# Patient Record
Sex: FEMALE | Race: WHITE | NOT HISPANIC OR LATINO
[De-identification: names, ages, dates, MRNs, and addresses within clinical notes are randomized per-mention and may not be internally consistent; named-entity substitution may affect disease eponyms.]

---

## 2019-01-25 ENCOUNTER — APPOINTMENT (OUTPATIENT)
Dept: ORTHOPEDIC SURGERY | Facility: CLINIC | Age: 69
End: 2019-01-25
Payer: MEDICARE

## 2019-01-25 VITALS — BODY MASS INDEX: 33.66 KG/M2 | HEIGHT: 63 IN | WEIGHT: 190 LBS

## 2019-01-25 DIAGNOSIS — Z78.9 OTHER SPECIFIED HEALTH STATUS: ICD-10-CM

## 2019-01-25 DIAGNOSIS — Z86.39 PERSONAL HISTORY OF OTHER ENDOCRINE, NUTRITIONAL AND METABOLIC DISEASE: ICD-10-CM

## 2019-01-25 PROCEDURE — 20611 DRAIN/INJ JOINT/BURSA W/US: CPT | Mod: RT

## 2019-01-25 PROCEDURE — 99204 OFFICE O/P NEW MOD 45 MIN: CPT | Mod: 25

## 2019-01-25 RX ORDER — MELOXICAM 7.5 MG/1
7.5 TABLET ORAL
Refills: 0 | Status: ACTIVE | COMMUNITY

## 2019-01-25 RX ORDER — DICLOFENAC SODIUM 75 MG/1
75 TABLET, DELAYED RELEASE ORAL
Qty: 60 | Refills: 1 | Status: COMPLETED | COMMUNITY
Start: 2019-01-25 | End: 2019-03-26

## 2019-01-25 RX ORDER — CYCLOBENZAPRINE HYDROCHLORIDE 5 MG/1
5 TABLET, FILM COATED ORAL
Refills: 0 | Status: ACTIVE | COMMUNITY

## 2019-01-25 RX ORDER — GLIMEPIRIDE 4 MG/1
TABLET ORAL
Refills: 0 | Status: ACTIVE | COMMUNITY

## 2019-01-25 RX ORDER — LEVOTHYROXINE SODIUM 0.03 MG/1
25 TABLET ORAL
Refills: 0 | Status: ACTIVE | COMMUNITY

## 2019-01-25 RX ORDER — METFORMIN HYDROCHLORIDE 625 MG/1
TABLET ORAL
Refills: 0 | Status: ACTIVE | COMMUNITY

## 2019-01-25 RX ORDER — DICLOFENAC SODIUM 10 MG/G
1 GEL TOPICAL
Refills: 0 | Status: ACTIVE | COMMUNITY

## 2019-01-25 NOTE — HISTORY OF PRESENT ILLNESS
[de-identified] : CC right hip\par \par HPI 67 yo female right HD presents with gradual onset of many months of occasional pain in the right buttock [without Injury]. The pain is same, and rated a 10 out of 10, described as burning throbbing aching, with radiation down the leg to the foot with numbness. Nothing makes the pain better and everything makes the pain worse. The patient reports associated symptoms of numbness weakness. The patient + similar pain previously spontaneously resolves.\par \par \par Previous treatments include:\par Activity Modification	+\par Ice/Compression 	+\par NSAIDs  		+\par Physical Therapy 	-\par Cortisone Injection	-\par Surgery  		-\par \par Review of Systems is positive for the above musculoskeletal symptoms and is otherwise non-contributory for general, constitutional, psychiatric, neurologic, HEENT, cardiac, respiratory, gastrointestinal, reproductive, lymphatic, and dermatologic complaints.\par \par Consult By Dr. Syeda Grace

## 2019-01-25 NOTE — DISCUSSION/SUMMARY
[de-identified] : Mild right hip osteoarthritis\par Likely radicular pain down the right lower extremity\par \par Discussed my findings and history exam and radiology given the above I recommend proceeding with ruling out hip pathology as the source of the patient's pain or treating her nonoperatively with a fall\par \par The patient was prescribed Diclofenac PO non-steroidal anti-inflammatory medication. 50mg tablets twice daily to be taken for at least 1-2 weeks in a row and then PRN afterwards. Risks and benefits were discussed and include but not limited to renal damage and GI ulceration and bleeding.  They were advised to take with food to limit stomach upset as well as warned to stop the medication if worsening gastric pain or dizziness or other side effects. Also to immediately stop the medication and seek appropriate medical attention if any severe stomach ache, gastritis, black/red vomit, black/red stools or any other medical concern.\par \par \par Physical therapy\par \par Procedure Note:\par \par Verbal consent was obtained for arthrocentesis and intra-articular corticosteroid injection of the RIGHT hip, after the risks and benefits were discussed with the patient. Potential adverse effects were discussed including but not limited to bleeding, skin/ joint infection, local skin reactions including bleaching, bruising, stiffness, soreness, vasovagal episodes, transient hyperglycemia, avascular necrosis, pseudo-septic type reactions, post injection joint pain, allergic reaction to product or anesthetic and other rare but potential adverse effects along with benefits including decreased pain and improved stability prior to obtaining verbal informed consent. It was also discussed that for some patients the treatment is ineffective and there are no guarantees that the patient will experience improvement as the result of the injection. In rare occasions the injection can cause worsening of pain.\par \par The use of a Sonosite 5-2 MHz curvilinear transducer with live ultrasound guidance of the hip injection was necessary given the patient's BMI and local body habitus overlying and obscuring the identification of normal body bony anatomy used to identify the injection site and the depth of soft tissue envelope necessitating a longer than normal needle to reach the joint space, and to confirm the location of the needle tip and intra-articular delivery of the medication. Without the use of live ultrasound guidance the injection would have been more difficult and place the patient's neurovascular structures at risk from the longer needle needed to traverse the soft tissue envelope.\par \par The anterolateral injection site was identified using the ultrasound probe to identify the femoral head and anterior neck longitudinally along the femoral neck axis. The injection site was marked and prepped with a ChloraPrep swab and anesthetized with ethylchloride skin anesthesia. Using sterile technique a 20g 3 1/2 in spinal needle with 3 cc total of 1cc 1% lidocaine without epinephrine, 1cc 0.25% Marcaine without epinephrine and 1cc of 40mg/mL Kenalog was passed through the injection site towards the intra-capsular portion of the anterior femoral neck. After penetrating the joint capsule, the medication was injected without resistance under live ultrasound visualization and noted to distend the joint capsule. The injection site was sterilely dressed, there was minimal blood loss. The patient tolerated this procedure without any complications done by myself. Images were recorded and saved.\par \par The patient has been advised that if they notice any worsening of symptoms or any problems to contact me and seek care from a qualified medical professional. The patient was instructed to ice the hip and take NSAID medication on an as needed basis if the patient feels discomfort.\par \par Followup left physical therapy if unimproved

## 2019-01-25 NOTE — PHYSICAL EXAM
[de-identified] : Physical Examination\par General: well nourished, in no acute distress, alert and oriented to person, place and time\par Psychiatric: normal mood and affect, no abnormal movements or speech patterns\par Eyes: vision intact - glasses\par Throat: no thyromegaly\par Lymph: no enlarged nodes, no lymphedema in extremity\par Respiratory: no wheezing, no shortness of breath with ambulation\par Cardiac: no cardiac leg swelling, 2+ peripheral pulses\par Neurology: normal gross sensation in extremities to light touch\par Abdomen: soft, non-tender, tympanic, no masses\par \par Musculoskeletal Examination\par Ambulation	- antalgic gait, - assistive devices\par \par Hip			Right			Left\par General\par      Swelling/Deformity	normal			normal	\par      Skin			normal			normal\par      Erythema		-			-\par      Standing Alignment	neutral			neutral\par Range of Motion\par      Flexion		90			90\par      Abduction		30			30\par      Flex ER		45			45\par      Flex IR		15			15\par      Knee        		0 - 130			0 - 130\par Provocative Exam\par      Log Roll		-			-\par      Heel Strike		-			-\par      Fig 4			-			-\par      SLR			-			-\par - rush BL\par Palpation\par      Public Symphysis	-			-\par      Groin   	 	-			-\par      Greater Trochanter	-			-\par      Piriformis		+			-\par      SI Joint		-			-\par Strength Examination/Atrophy\par      Hip Flexor		5+			5+\par      Quadriceps		5-			5+\par      Hamstring		5-			5+\par Sensation\par      Deep Peroneal        	normal			normal\par      Superficial Peroneal 	normal			normal\par      Sural  	 	normal			normal\par      Posterior Tibial        	normal			normal\par      Saphenous		normal			normal\par Pulse\par      DP			2+			2+\par  [de-identified] : X-ray right hip 2 views dated 11/29-18 brought in on CD were evaluated by myself today and demonstrate\par normal acetabular and femoral neck morphology\par No acetabular osteophytes\par No asymmetric superior joint space loss\par Spherical femoral head with small osteophyte

## 2019-03-15 ENCOUNTER — APPOINTMENT (OUTPATIENT)
Dept: ORTHOPEDIC SURGERY | Facility: CLINIC | Age: 69
End: 2019-03-15
Payer: MEDICARE

## 2019-03-15 PROCEDURE — 99214 OFFICE O/P EST MOD 30 MIN: CPT

## 2019-03-15 PROCEDURE — 72110 X-RAY EXAM L-2 SPINE 4/>VWS: CPT

## 2019-03-15 NOTE — PHYSICAL EXAM
[de-identified] : Physical Examination\par General: well nourished, in no acute distress, alert and oriented to person, place and time\par Psychiatric: normal mood and affect, no abnormal movements or speech patterns\par Eyes: vision intact - glasses\par Throat: no thyromegaly\par Lymph: no enlarged nodes, no lymphedema in extremity\par Respiratory: no wheezing, no shortness of breath with ambulation\par Cardiac: no cardiac leg swelling, 2+ peripheral pulses\par Neurology: normal gross sensation in extremities to light touch\par Abdomen: soft, non-tender, tympanic, no masses\par \par Musculoskeletal Examination\par Ambulation	- antalgic gait, - assistive devices\par \par Hip			Right			Left\par General\par      Swelling/Deformity	normal			normal	\par      Skin			normal			normal\par      Erythema		-			-\par      Standing Alignment	neutral			neutral\par Range of Motion\par      Flexion		90			90\par      Abduction		30			30\par      Flex ER		45			45\par      Flex IR		15			15\par      Knee        		0 - 130			0 - 130\par Provocative Exam\par      Log Roll		-			-\par      Heel Strike		-			-\par      Fig 4			-			-\par      SLR			-			-\par - rush BL\par Palpation\par      Public Symphysis	-			-\par      Groin   	 	-			-\par      Greater Trochanter	-			-\par      Piriformis		+			-\par      SI Joint		-			-\par Strength Examination/Atrophy\par      Hip Flexor		5+			5+\par      Quadriceps		5-			5+\par      Hamstring		5-			5+\par      EHL/FHL		5-			5+\par      Gastroc		5-			5+\par Patella reflex 1+ right 2+ left\par Achilles relfex 0+ right 2+ left\par Sensation\par      Deep Peroneal        	normal			normal\par      Superficial Peroneal 	normal			normal\par      Sural  	 	normal			normal\par      Posterior Tibial        	normal			normal\par      Saphenous		normal			normal\par Pulse\par      DP			2+			2+ [de-identified] : X-ray right hip 2 views dated 11/29-18 brought in on CD  demonstrate\par normal acetabular and femoral neck morphology\par No acetabular osteophytes\par No asymmetric superior joint space loss\par Spherical femoral head with small osteophyte\par \par \par 4 views of the lumbar spine AP lateral and obliques were ordered by myself today taken today and evaluated by myself today and demonstrate:\par No anterior or a retrolisthesis of the spine\par No loss of disc height or evidence of osteoarthritic changes in the posterior processes\par No bone lesions

## 2019-03-15 NOTE — DISCUSSION/SUMMARY
[de-identified] : Mild right hip osteoarthritis\par Likely radicular pain down the right lower extremity\par \par Discussed my findings and history exam and radiology given the above I recommend proceeding with ruling out hip pathology as the source of the patient's pain or treating her nonoperatively with a fall\par \par \par given the failure of non-op management of non-diagnostic CSI, PT and nsaids and non-diagnostic lumbar spine XR recommend lumbar spine MRI\par \par The patient has been advised that if they notice any worsening of symptoms or any problems to contact me and seek care from a qualified medical professional. The patient was instructed to ice the hip and take NSAID medication on an as needed basis if the patient feels discomfort.\par \par Followup after MRI

## 2019-03-15 NOTE — HISTORY OF PRESENT ILLNESS
[de-identified] : CC right hip\par \par HPI 67 yo female right HD presents for CSI and PT FU of gradual onset of many months of occasional pain in the right buttock [without Injury]. The pain is same, and rated a 10 out of 10, described as burning throbbing aching, with radiation down the leg to the foot with numbness. Nothing makes the pain better and everything makes the pain worse. The patient reports associated symptoms of numbness weakness. The patient + similar pain previously spontaneously resolves.\par \par \par Previous treatments include:\par Activity Modification	+\par Ice/Compression 	+\par NSAIDs  		+\par Physical Therapy 	+ 6 weeks prior\par Cortisone Injection	+ right hip with less than 10% improvement for 2 days\par Surgery  		-\par \par Review of Systems is positive for the above musculoskeletal symptoms and is otherwise non-contributory for general, constitutional, psychiatric, neurologic, HEENT, cardiac, respiratory, gastrointestinal, reproductive, lymphatic, and dermatologic complaints.\par \par Consult By Dr. Syeda Grace

## 2019-04-15 ENCOUNTER — APPOINTMENT (OUTPATIENT)
Dept: ORTHOPEDIC SURGERY | Facility: CLINIC | Age: 69
End: 2019-04-15
Payer: MEDICARE

## 2019-04-15 DIAGNOSIS — M25.551 PAIN IN RIGHT HIP: ICD-10-CM

## 2019-04-15 PROCEDURE — 99213 OFFICE O/P EST LOW 20 MIN: CPT

## 2019-04-15 NOTE — DISCUSSION/SUMMARY
[de-identified] : Mild right hip osteoarthritis\par lumbar spine foraminal stenosis with right lower extremity referred pain\par \par mri reviewed at length with images and read.\par \par her god son is an ortho spine at [facility]/[place] who recommended she see a spine surgeon\par \par I recommend she see Dr Pak.\par \par FU\par prn

## 2019-04-15 NOTE — PHYSICAL EXAM
[de-identified] : Physical Examination\par General: well nourished, in no acute distress, alert and oriented to person, place and time\par Psychiatric: normal mood and affect, no abnormal movements or speech patterns\par Eyes: vision intact - glasses\par Throat: no thyromegaly\par Lymph: no enlarged nodes, no lymphedema in extremity\par Respiratory: no wheezing, no shortness of breath with ambulation\par Cardiac: no cardiac leg swelling, 2+ peripheral pulses\par Neurology: normal gross sensation in extremities to light touch\par Abdomen: soft, non-tender, tympanic, no masses\par \par Musculoskeletal Examination\par Ambulation	- antalgic gait, - assistive devices\par \par Hip			Right			Left\par General\par      Swelling/Deformity	normal			normal	\par      Skin			normal			normal\par      Erythema		-			-\par      Standing Alignment	neutral			neutral\par Range of Motion\par      Flexion		90			90\par      Abduction		30			30\par      Flex ER		45			45\par      Flex IR		15			15\par      Knee        		0 - 130			0 - 130\par Provocative Exam\par      Log Roll		-			-\par      Heel Strike		-			-\par      Fig 4			-			-\par      SLR			-			-\par - rush BL\par Palpation\par      Public Symphysis	-			-\par      Groin   	 	-			-\par      Greater Trochanter	-			-\par      Piriformis		+			-\par      SI Joint		-			-\par Strength Examination/Atrophy\par      Hip Flexor		5+			5+\par      Quadriceps		5-			5+\par      Hamstring		5-			5+\par      EHL/FHL		5-			5+\par      Gastroc		5-			5+\par Patella reflex 1+ right 2+ left\par Achilles relfex 0+ right 2+ left\par Sensation\par      Deep Peroneal        	normal			normal\par      Superficial Peroneal 	normal			normal\par      Sural  	 	normal			normal\par      Posterior Tibial        	normal			normal\par      Saphenous		normal			normal\par Pulse\par      DP			2+			2+ [de-identified] : X-ray right hip 2 views dated 11/29-18 brought in on CD  demonstrate\par normal acetabular and femoral neck morphology\par No acetabular osteophytes\par No asymmetric superior joint space loss\par Spherical femoral head with small osteophyte\par \par \par 4 views of the lumbar spine AP lateral and obliques 3-2019 demonstrate:\par No anterior or a retrolisthesis of the spine\par No loss of disc height or evidence of osteoarthritic changes in the posterior processes\par No bone lesions\par \par \par \par \par MRI lumbar spine from [facility] radiology dated 4-3-19\par My impression of images:\par There is a non-fluid signal cyst within spinal canal with possible foraminal stenosis\par \par Formal impression:\par Vertebral body heights, alignment and marrow signal homogeneity are maintained throughout the visualized spinal segments.\par \par Multilevel mild to moderate disc desiccation is noted indicative of mild disc degeneration. There is no loss of disc height. There is anterior osteophyte formation at T11-T12 and L1-L2. There are no endplate changes. The conus medullaris terminates at the upper L1 level is unremarkable in appearance.\par \par The paraspinal muscles demonstrate normal and symmetric appearance. Visualized intra-abdominal/intrapelvic contents demonstrate no acute abnormalities..\par \par L1-L2: Diffuse degenerative disc bulge and ligamentum flavum thickening resulting in mild-to-moderate spinal canal stenosis. There is moderate right and mild to moderate left foraminal stenosis.\par \par L2-L3: Small diffuse disc ligamentum flavum thickening resulting in mild spinal canal stenosis. There is mild to moderate left and mild right foraminal stenosis. Mild facet degeneration.\par \par L3-L4: Diffuse degenerative disc bulge and ligamentum flavum thickening and mildly prominent posterior epidural fat resulting in moderate spinal canal stenosis. There is mild facet degeneration. There is mild to moderate left and mild right foraminal stenosis.\par \par L4-L5: Diffuse degenerative disc bulge and ligamentum flavum thickening and moderate bilateral facet degeneration. Mildly prominent posterior epidural fat. Findings result in moderate to severe spinal canal stenosis with tapering of the thecal sac. In addition, there is a small rounded low signal structure measuring 6 mm, projecting anteriorly and medially from the right facet joint (series 5 and series 7, image 11, and series 8, image 18) which may reflect a facet osteophyte or small calcified synovial cyst. There is appears to contact and likely compress the descending right L5 nerve root in the right lateral recess. There is mild bilateral foraminal stenosis.\par \par L5-S1: Small degenerative disc bulge. Severe bilateral facet degeneration. There is a 4 mm synovial cyst projecting anteromedially from the right facet joint which appears to contact and compress the emerging right S1 nerve root in the right lateral recess (series 8 image 23). There is also ligamentum flavum thickening and findings are superimposed on overall mild to moderate spinal canal stenosis. There is slight foraminal extension of disc material on the left causing moderate left foraminal stenosis. Mild right foraminal stenosis is also noted.\par \par IMPRESSION: \par \par Multilevel degenerative lumbar spondylosis.\par \par L1-L2: Mild to moderate spinal canal stenosis and moderate right foraminal stenosis.\par L3-L4: Moderate spinal canal stenosis.\par L4-L5: Moderate to severe spinal canal stenosis with superimposed rounded 6 mm structure projecting anteriorly medially from the right facet joint which may reflect a facet osteophyte or small calcified synovial cyst. This appears to contact and likely compress the descending right L5 nerve root in the right lateral recess. Severe bilateral facet degeneration.\par L5-S1: 4 mm synovial cyst projecting anteriorly and medially from the right facet joint appearing to contact and compress the emergency right S1 nerve root in the right lateral recess. Findings are superimposed on overall mild to moderate spinal canal stenosis. There is moderate left foraminal stenosis. Severe bilateral facet degeneration noted.

## 2019-04-15 NOTE — HISTORY OF PRESENT ILLNESS
[de-identified] : CC right hip\par \par HPI 67 yo female right HD presents for MRI FU of gradual onset of many months of occasional pain in the right buttock [without Injury]. The pain is same, and rated a 10 out of 10, described as burning throbbing aching, with radiation down the leg to the foot with numbness. Nothing makes the pain better and everything makes the pain worse. The patient reports associated symptoms of numbness weakness. The patient + similar pain previously spontaneously resolves.\par \par \par Previous treatments include:\par Activity Modification	+\par Ice/Compression 	+\par NSAIDs  		+\par Physical Therapy 	+ 6 weeks prior\par Cortisone Injection	+ right hip with less than 10% improvement for 2 days\par Surgery  		-\par \par Review of Systems is positive for the above musculoskeletal symptoms and is otherwise non-contributory for general, constitutional, psychiatric, neurologic, HEENT, cardiac, respiratory, gastrointestinal, reproductive, lymphatic, and dermatologic complaints.\par \par Consult By Dr. Syeda Grace

## 2019-04-24 ENCOUNTER — APPOINTMENT (OUTPATIENT)
Dept: ORTHOPEDIC SURGERY | Facility: CLINIC | Age: 69
End: 2019-04-24
Payer: MEDICARE

## 2019-04-24 DIAGNOSIS — M54.16 RADICULOPATHY, LUMBAR REGION: ICD-10-CM

## 2019-04-24 DIAGNOSIS — M48.07 SPINAL STENOSIS, LUMBOSACRAL REGION: ICD-10-CM

## 2019-04-24 DIAGNOSIS — M43.16 SPONDYLOLISTHESIS, LUMBAR REGION: ICD-10-CM

## 2019-04-24 PROCEDURE — 99214 OFFICE O/P EST MOD 30 MIN: CPT
